# Patient Record
Sex: MALE | Race: WHITE
[De-identification: names, ages, dates, MRNs, and addresses within clinical notes are randomized per-mention and may not be internally consistent; named-entity substitution may affect disease eponyms.]

---

## 2021-09-30 ENCOUNTER — HOSPITAL ENCOUNTER (EMERGENCY)
Dept: HOSPITAL 41 - JD.ED | Age: 51
Discharge: HOME | End: 2021-09-30
Payer: COMMERCIAL

## 2021-09-30 DIAGNOSIS — L08.9: Primary | ICD-10-CM

## 2021-09-30 DIAGNOSIS — Z87.891: ICD-10-CM

## 2021-09-30 DIAGNOSIS — Z88.1: ICD-10-CM

## 2021-09-30 DIAGNOSIS — E66.9: ICD-10-CM

## 2021-09-30 DIAGNOSIS — I10: ICD-10-CM

## 2021-09-30 NOTE — EDM.PDOC
ED HPI GENERAL MEDICAL PROBLEM





- General


Chief Complaint: Skin Complaint


Stated Complaint: LEFT INDEX FINGER INJURY


Time Seen by Provider: 09/30/21 19:03


Source of Information: Reports: Patient, Significant Other (Fianc)


History Limitations: Reports: No Limitations





- History of Present Illness


INITIAL COMMENTS - FREE TEXT/NARRATIVE: 





Mr. Kyle is a very pleasant 51-year-old gentleman who states that he developed 

itchiness, redness, warmth, and swelling to the dorsal aspect of his left 2nd 

MCP joint around Friday, 9/24/2021.  He was seen at an urgent care in [place] on Sunday night, 9/26/2021.  He states that no tests were done, but that he

was prescribed cephalexin 500 mg po Q12 hrs. he was then seen at the [place] ED on Tuesday morning, 9/28/2021.  He states that a CBC, CMP, CRP, swab for

the SARS-CoV-2 virus, and ultrasound of his left hand, and a CT of his left 

upper extremity were performed.  The CBC found leukocytosis of 14.2, the CMP 

slight hyperglycemia of 122, the CRP significantly elevated at 50.9, the 

ultrasound evidence of fluid in the area of concern, and the CT a 1.5 cm pocket 

of fluid that may extend into the joint.  The patient states that the Emergency 

Physician performed an incision and drainage of fluid from the dorsal aspect of 

the patient's 2nd MCP joint, but the patient does not believe that any pus came 

out.  He was admitted to the hospital, where he was treated with IV Vanco + some

other antibiotic whose name he does not recall.  A CBC yesterday, on 9/29/2021, 

found his WBC count to be down to 8.4.  He was discharged home today with 

prescriptions for clindamycin, linezolid, and oxycodone.





The patient now presents the ED because he still has swelling around the 2nd MCP

joint and his hand, and he is concerned that he might get bursitis or a blood-

borne infection.





Here in the ED, the patient's initial BP is found to be elevated at 167/85, 

otherwise, he is hemodynamically stable, afebrile, saturating 99% on room air.  

He appears to be comfortable, in no acute distress.





Other than his left hand issue, the patient denies having a recent fever, 

chills, sore throat, ear pain, nasal or sinus congestion, cough, dyspnea, chest 

pain, palpitations, nausea, vomiting, constipation, diarrhea, abdominal pain, 

urinary symptoms, recent weight gain or weight loss, recent bloody bowel 

movements or black bowel movements, recent joint aches, headaches, or rashes.








[facility] [place]


He received a single [employer] COVID vaccination on 7/2/2021.








Treatments PTA: Reports: NSAIDS


  ** Left Hand


Pain Score (Numeric/FACES): 7





- Related Data


                                    Allergies











Allergy/AdvReac Type Severity Reaction Status Date / Time


 


ceftriaxone [From Rocephin] Allergy  Hives Verified 09/30/21 19:19











Home Meds: 


                                    Home Meds





Clindamycin HCl 150 mg PO TID 09/30/21 [History]


Linezolid [Zyvox] 30 ml PO BID 09/30/21 [History]


oxyCODONE 5 mg PO Q6H PRN 09/30/21 [History]











Past Medical History


HEENT History: Reports: Impaired Vision


Cardiovascular History: Reports: Hypertension (untreated)


Respiratory History: Reports: Sleep Apnea (noncompliant with nightly CPAP)


Musculoskeletal History: Reports: Osteoarthritis (fingers)


Endocrine/Metabolic History: Reports: Obesity/BMI 30+, Other (See Below) 

(Prediabetes)





- Past Surgical History


HEENT Surgical History: Reports: Oral Surgery (dental extractions)


Musculoskeletal Surgical History: Reports: Arthroscopic Knee (right x 3, left x 

4)





Social & Family History





- Tobacco Use


Tobacco Use Status *Q: Former Tobacco User


Years of Tobacco use: 1


Packs/Tins Daily: 1


Month/Year Tobacco Last Used: Quit 1991


Tobacco Use Comment: Started smoking 1990





- Alcohol Use


Alcohol Use History: Yes


Alcohol Use Frequency: Socially





- Recreational Drug Use


Recreational Drug Use: No





- Living Situation & Occupation


Living situation: Reports: , with Significant Other (Fianc), with 

Family (2 teenagers)


Occupation: Employed ()





ED ROS GENERAL





- Review of Systems


Review Of Systems: Comprehensive ROS is negative, except as noted in HPI.





ED EXAM, SKIN/RASH


Exam: See Below


Exam Limited By: No Limitations


General Appearance: Alert, WD/WN, No Apparent Distress


Extremities: Other (Mild to moderate swelling of the radial aspect of the 

patient's left hand, particularly around the 2nd MCP joint, but extending onto 

the distal radial aspect palm.  Minimal erythema and calor.  There is an 

approximately 1 cm surgical wound to the dorsal-radial aspect of the left 2nd 

MCP.  Neurovascu)





Course





- Vital Signs


Last Recorded V/S: 


                                Last Vital Signs











Temp  36.2 C   09/30/21 19:11


 


Pulse  89   09/30/21 19:11


 


Resp  16   09/30/21 19:11


 


BP  167/85 H  09/30/21 19:11


 


Pulse Ox  99   09/30/21 19:11














- Re-Assessments/Exams


Free Text/Narrative Re-Assessment/Exam: 





09/30/21 19:45


The CT of the patient's left upper extremity obtained 9/28/2021 indicated 

possible extension of the fluid pocket into the 2nd MCP joint space.  If that is

 true, then the patient may need surgical debridement.  I will refer him to Dr. Cruz for further evaluation.  In the meantime, the patient should continue his 

currently prescribed clindamycin and linezolid.














Departure





- Departure


Time of Disposition: 19:47


Disposition: Home, Self-Care 01


Condition: Good


Clinical Impression: 


 Infection of left hand








- Discharge Information


*PRESCRIPTION DRUG MONITORING PROGRAM REVIEWED*: Not Applicable


*COPY OF PRESCRIPTION DRUG MONITORING REPORT IN PATIENT [facility]: Not Applicable


Referrals: 


PCP,None [Primary Care Provider] - 


Uli Cruz MD [Physician] - 


Forms:  ED Department Discharge


Additional Instructions: 


You were seen in the emergency room over concern of prolonged healing of a left 

hand infection.





Reviewing the CT of your left upper extremity obtained 9/28/2021, there may be 

extension of the fluid pocket into your index knuckle joint.  If that is true, 

then you may need to have surgical debridement.





We recommend that you continue to take your clindamycin and linezolid as 

prescribed.





Please follow-up with the Orthopedic Surgeon Dr. Uli Cruz at the next 

available appointment.





If any other problems, please do not hesitate to return to the ER.





Sepsis Event Note (ED)





- Evaluation


Sepsis Screening Result: No Definite Risk





- Focused Exam


Vital Signs: 


                                   Vital Signs











  Temp Pulse Resp BP Pulse Ox


 


 09/30/21 19:11  36.2 C  89  16  167/85 H  99